# Patient Record
Sex: FEMALE | Race: WHITE | NOT HISPANIC OR LATINO | Employment: FULL TIME | URBAN - METROPOLITAN AREA
[De-identification: names, ages, dates, MRNs, and addresses within clinical notes are randomized per-mention and may not be internally consistent; named-entity substitution may affect disease eponyms.]

---

## 2018-02-13 ENCOUNTER — OFFICE VISIT CONVERTED (OUTPATIENT)
Dept: FAMILY MEDICINE CLINIC | Facility: CLINIC | Age: 23
End: 2018-02-13
Attending: NURSE PRACTITIONER

## 2018-08-23 ENCOUNTER — OFFICE VISIT CONVERTED (OUTPATIENT)
Dept: FAMILY MEDICINE CLINIC | Facility: CLINIC | Age: 23
End: 2018-08-23
Attending: NURSE PRACTITIONER

## 2018-11-12 ENCOUNTER — OFFICE VISIT CONVERTED (OUTPATIENT)
Dept: FAMILY MEDICINE CLINIC | Facility: CLINIC | Age: 23
End: 2018-11-12
Attending: FAMILY MEDICINE

## 2018-11-12 ENCOUNTER — CONVERSION ENCOUNTER (OUTPATIENT)
Dept: FAMILY MEDICINE CLINIC | Facility: CLINIC | Age: 23
End: 2018-11-12

## 2019-03-16 ENCOUNTER — OFFICE VISIT CONVERTED (OUTPATIENT)
Dept: FAMILY MEDICINE CLINIC | Facility: CLINIC | Age: 24
End: 2019-03-16
Attending: NURSE PRACTITIONER

## 2019-03-16 ENCOUNTER — CONVERSION ENCOUNTER (OUTPATIENT)
Dept: FAMILY MEDICINE CLINIC | Facility: CLINIC | Age: 24
End: 2019-03-16

## 2019-05-06 ENCOUNTER — OFFICE VISIT CONVERTED (OUTPATIENT)
Dept: FAMILY MEDICINE CLINIC | Facility: CLINIC | Age: 24
End: 2019-05-06
Attending: NURSE PRACTITIONER

## 2019-12-02 ENCOUNTER — CONVERSION ENCOUNTER (OUTPATIENT)
Dept: FAMILY MEDICINE CLINIC | Facility: CLINIC | Age: 24
End: 2019-12-02

## 2019-12-02 ENCOUNTER — OFFICE VISIT CONVERTED (OUTPATIENT)
Dept: FAMILY MEDICINE CLINIC | Facility: CLINIC | Age: 24
End: 2019-12-02
Attending: NURSE PRACTITIONER

## 2020-01-29 ENCOUNTER — CONVERSION ENCOUNTER (OUTPATIENT)
Dept: FAMILY MEDICINE CLINIC | Facility: CLINIC | Age: 25
End: 2020-01-29

## 2020-01-29 ENCOUNTER — OFFICE VISIT CONVERTED (OUTPATIENT)
Dept: FAMILY MEDICINE CLINIC | Facility: CLINIC | Age: 25
End: 2020-01-29
Attending: FAMILY MEDICINE

## 2020-06-30 ENCOUNTER — OFFICE VISIT CONVERTED (OUTPATIENT)
Dept: FAMILY MEDICINE CLINIC | Facility: CLINIC | Age: 25
End: 2020-06-30
Attending: FAMILY MEDICINE

## 2020-06-30 ENCOUNTER — HOSPITAL ENCOUNTER (OUTPATIENT)
Dept: FAMILY MEDICINE CLINIC | Facility: CLINIC | Age: 25
Discharge: HOME | End: 2020-06-30
Attending: FAMILY MEDICINE

## 2020-07-03 LAB
BACTERIA SPEC AEROBE CULT: ABNORMAL
SARS-COV-2 RNA SPEC QL NAA+PROBE: NOT DETECTED

## 2021-04-16 ENCOUNTER — OFFICE VISIT CONVERTED (OUTPATIENT)
Dept: FAMILY MEDICINE CLINIC | Facility: CLINIC | Age: 26
End: 2021-04-16
Attending: NURSE PRACTITIONER

## 2021-04-16 ENCOUNTER — HOSPITAL ENCOUNTER (OUTPATIENT)
Dept: FAMILY MEDICINE CLINIC | Facility: CLINIC | Age: 26
Discharge: HOME | End: 2021-04-16
Attending: NURSE PRACTITIONER

## 2021-04-16 LAB
ALBUMIN SERPL-MCNC: 4.6 G/DL (ref 3.5–5)
ALBUMIN/GLOB SERPL: 1.7 {RATIO} (ref 1.4–2.6)
ALP SERPL-CCNC: 52 U/L (ref 42–98)
ALT SERPL-CCNC: 21 U/L (ref 10–40)
ANION GAP SERPL CALC-SCNC: 16 MMOL/L (ref 8–19)
AST SERPL-CCNC: 25 U/L (ref 15–50)
BASOPHILS # BLD AUTO: 0.04 10*3/UL (ref 0–0.2)
BASOPHILS NFR BLD AUTO: 0.7 % (ref 0–3)
BILIRUB SERPL-MCNC: 0.39 MG/DL (ref 0.2–1.3)
BUN SERPL-MCNC: 10 MG/DL (ref 5–25)
BUN/CREAT SERPL: 11 {RATIO} (ref 6–20)
CALCIUM SERPL-MCNC: 9.3 MG/DL (ref 8.7–10.4)
CHLORIDE SERPL-SCNC: 101 MMOL/L (ref 99–111)
CK MB CFR SERPL ELPH: <1 NG/ML (ref 1–3.8)
CONV ABS IMM GRAN: 0.01 10*3/UL (ref 0–0.2)
CONV CO2: 26 MMOL/L (ref 22–32)
CONV IMMATURE GRAN: 0.2 % (ref 0–1.8)
CONV TOTAL PROTEIN: 7.3 G/DL (ref 6.3–8.2)
CREAT UR-MCNC: 0.95 MG/DL (ref 0.5–0.9)
DEPRECATED RDW RBC AUTO: 43.3 FL (ref 36.4–46.3)
EOSINOPHIL # BLD AUTO: 0.2 10*3/UL (ref 0–0.7)
EOSINOPHIL # BLD AUTO: 3.3 % (ref 0–7)
ERYTHROCYTE [DISTWIDTH] IN BLOOD BY AUTOMATED COUNT: 13.2 % (ref 11.7–14.4)
GFR SERPLBLD BASED ON 1.73 SQ M-ARVRAT: >60 ML/MIN/{1.73_M2}
GLOBULIN UR ELPH-MCNC: 2.7 G/DL (ref 2–3.5)
GLUCOSE SERPL-MCNC: 91 MG/DL (ref 65–99)
HCT VFR BLD AUTO: 40.6 % (ref 37–47)
HGB BLD-MCNC: 12.8 G/DL (ref 12–16)
LYMPHOCYTES # BLD AUTO: 1.71 10*3/UL (ref 1–5)
LYMPHOCYTES NFR BLD AUTO: 27.9 % (ref 20–45)
MAGNESIUM SERPL-MCNC: 2.03 MG/DL (ref 1.6–2.3)
MCH RBC QN AUTO: 28.1 PG (ref 27–31)
MCHC RBC AUTO-ENTMCNC: 31.5 G/DL (ref 33–37)
MCV RBC AUTO: 89 FL (ref 81–99)
MONOCYTES # BLD AUTO: 0.45 10*3/UL (ref 0.2–1.2)
MONOCYTES NFR BLD AUTO: 7.4 % (ref 3–10)
NEUTROPHILS # BLD AUTO: 3.71 10*3/UL (ref 2–8)
NEUTROPHILS NFR BLD AUTO: 60.5 % (ref 30–85)
NRBC CBCN: 0 % (ref 0–0.7)
OSMOLALITY SERPL CALC.SUM OF ELEC: 287 MOSM/KG (ref 273–304)
PLATELET # BLD AUTO: 285 10*3/UL (ref 130–400)
PMV BLD AUTO: 10.5 FL (ref 9.4–12.3)
POTASSIUM SERPL-SCNC: 3.8 MMOL/L (ref 3.5–5.3)
RBC # BLD AUTO: 4.56 10*6/UL (ref 4.2–5.4)
SODIUM SERPL-SCNC: 139 MMOL/L (ref 135–147)
T4 FREE SERPL-MCNC: 1.2 NG/DL (ref 0.9–1.8)
TROPONIN T SERPL-MCNC: <0.01 NG/ML (ref 0–0.1)
TSH SERPL-ACNC: 1.07 M[IU]/L (ref 0.27–4.2)
WBC # BLD AUTO: 6.12 10*3/UL (ref 4.8–10.8)

## 2021-04-19 LAB
CONV EBV EARLY ANTIGEN: 55.2 U/ML (ref 0–10.9)
CONV EBV NUCLEAR ANTIGEN: 173 U/ML (ref 0–21.9)
CONV EPSTEIN BARR VIRAL CAPSID IGM: <10 U/ML (ref 0–43.9)
CONV EPSTEIN BARR VIRUS ANTIBODY TO VIRAL CAPSID IGG: 307 U/ML (ref 0–21.9)

## 2021-05-09 VITALS
DIASTOLIC BLOOD PRESSURE: 90 MMHG | SYSTOLIC BLOOD PRESSURE: 142 MMHG | OXYGEN SATURATION: 99 % | HEART RATE: 145 BPM | WEIGHT: 138 LBS | TEMPERATURE: 98.6 F

## 2021-05-09 VITALS
HEART RATE: 127 BPM | HEIGHT: 65 IN | SYSTOLIC BLOOD PRESSURE: 118 MMHG | OXYGEN SATURATION: 100 % | BODY MASS INDEX: 23.01 KG/M2 | WEIGHT: 138.12 LBS | TEMPERATURE: 98.9 F | DIASTOLIC BLOOD PRESSURE: 70 MMHG

## 2021-05-09 VITALS
HEART RATE: 113 BPM | SYSTOLIC BLOOD PRESSURE: 140 MMHG | BODY MASS INDEX: 21.99 KG/M2 | WEIGHT: 132 LBS | TEMPERATURE: 97.8 F | HEIGHT: 65 IN | OXYGEN SATURATION: 98 % | DIASTOLIC BLOOD PRESSURE: 96 MMHG

## 2021-05-09 VITALS
HEIGHT: 65 IN | OXYGEN SATURATION: 100 % | TEMPERATURE: 96.6 F | HEART RATE: 75 BPM | WEIGHT: 136 LBS | BODY MASS INDEX: 22.66 KG/M2 | SYSTOLIC BLOOD PRESSURE: 120 MMHG | DIASTOLIC BLOOD PRESSURE: 80 MMHG

## 2021-05-09 VITALS
DIASTOLIC BLOOD PRESSURE: 70 MMHG | HEART RATE: 99 BPM | TEMPERATURE: 97.4 F | SYSTOLIC BLOOD PRESSURE: 110 MMHG | WEIGHT: 134 LBS | OXYGEN SATURATION: 99 %

## 2021-05-09 VITALS
BODY MASS INDEX: 21.99 KG/M2 | SYSTOLIC BLOOD PRESSURE: 110 MMHG | WEIGHT: 132 LBS | OXYGEN SATURATION: 100 % | HEIGHT: 65 IN | DIASTOLIC BLOOD PRESSURE: 76 MMHG | HEART RATE: 111 BPM | TEMPERATURE: 98.3 F

## 2021-05-09 VITALS
DIASTOLIC BLOOD PRESSURE: 80 MMHG | WEIGHT: 133.37 LBS | TEMPERATURE: 98.1 F | HEART RATE: 87 BPM | OXYGEN SATURATION: 97 % | SYSTOLIC BLOOD PRESSURE: 112 MMHG

## 2021-05-09 VITALS
OXYGEN SATURATION: 100 % | DIASTOLIC BLOOD PRESSURE: 78 MMHG | HEART RATE: 110 BPM | SYSTOLIC BLOOD PRESSURE: 148 MMHG | TEMPERATURE: 98.9 F | WEIGHT: 133 LBS

## 2021-05-09 VITALS
OXYGEN SATURATION: 100 % | TEMPERATURE: 97.4 F | DIASTOLIC BLOOD PRESSURE: 80 MMHG | SYSTOLIC BLOOD PRESSURE: 120 MMHG | HEART RATE: 78 BPM | WEIGHT: 126 LBS

## 2021-05-12 ENCOUNTER — OFFICE VISIT CONVERTED (OUTPATIENT)
Dept: FAMILY MEDICINE CLINIC | Facility: CLINIC | Age: 26
End: 2021-05-12
Attending: FAMILY MEDICINE

## 2021-05-22 ENCOUNTER — TRANSCRIBE ORDERS (OUTPATIENT)
Dept: FAMILY MEDICINE CLINIC | Facility: CLINIC | Age: 26
End: 2021-05-22

## 2021-05-22 DIAGNOSIS — R07.9 CHEST PAIN IN ADULT: Primary | ICD-10-CM

## 2021-06-01 ENCOUNTER — HOSPITAL ENCOUNTER (OUTPATIENT)
Dept: OTHER | Facility: HOSPITAL | Age: 26
Discharge: HOME | End: 2021-06-01
Attending: FAMILY MEDICINE

## 2021-06-04 ENCOUNTER — OFFICE VISIT CONVERTED (OUTPATIENT)
Dept: FAMILY MEDICINE CLINIC | Facility: CLINIC | Age: 26
End: 2021-06-04
Attending: FAMILY MEDICINE

## 2021-06-05 NOTE — PROGRESS NOTES
Progress Note      Patient Name: Nikole Kearney   Patient ID: [id number]   Sex: Female   YOB: 1995        Visit Date: June 4, 2021    Provider: Shweta Pate DO   Location: [facility]   Location Address: [address] [place] [address] [place]  [address]   Location Phone: [phone]          Chief Complaint     Follow up on issues going on       History Of Present Illness  Nikole Kearney is a 25 year old /White female who presents for evaluation and treatment of:      1.) CHEST PAIN : COVID infection during the past few months. Patient presented with c/o chest pain. Workup so far has been negative. Patient reports that she continues to experience an intermittent chest pain - sometimes associated with exertions. Per patient - 'I wonder if I have environmental induced asthmatic sxs.'       Past Medical History  Disease Name Date Onset Notes   **No Past Medical History --  --          Past Surgical History  Procedure Name Date Notes   *Denies any surgical procedures --  --          Allergy List  Allergen Name Date Reaction Notes   amoxicillin --  --  --    PENICILLINS --  --  --          Family Medical History  Disease Name Relative/Age Notes   *No Significant Family Medical History  --          Social History  Finding Status Start/Stop Quantity Notes   Alcohol Never --/-- --  never drinks alcohol   Exercises regularly --  --/-- --  3-4 times per week   Recreational Drug Use Never --/-- --  never used   Tobacco Never --/-- --  never smoker, never uses other tobacco products   Uses seatbelts --  --/-- --  yes         Immunizations  NameDate Admin Mfg Trade Name Lot Number Route Inj VIS Given VIS Publication   [id number]/15/2018 UNK Unknown TradeName [id number] NE NE 11/12/2018 08/07/2015   Comments:          Review of Systems  · Constitutional  o Denies  o : fatigue, night sweats  · Eyes  o Denies  o : double vision, blurred vision  · HENT  o Denies  o : vertigo, recent head  injury  · Cardiovascular  o Admits  o : chest pain  o Denies  o : irregular heart beats  · Respiratory  o Denies  o : shortness of breath, productive cough  · Gastrointestinal  o Denies  o : nausea, vomiting  · Genitourinary  o Denies  o : dysuria, urinary retention  · Integument  o Denies  o : hair growth change, new skin lesions  · Neurologic  o Denies  o : altered mental status, seizures  · Musculoskeletal  o Denies  o : joint swelling, limitation of motion  · Endocrine  o Denies  o : cold intolerance, heat intolerance  · Heme-Lymph  o Denies  o : petechiae, lymph node enlargement or tenderness  · Allergic-Immunologic  o Denies  o : frequent illnesses      Vitals  Date Time BP Position Site L\R Cuff Size HR RR TEMP (F) WT  HT  BMI kg/m2 BSA m2 O2 Sat FR L/min FiO2 HC       06/04/2021 08:49 /78 Sitting    79 - R  97.5 138lbs 0oz    96 %            Physical Examination  · Constitutional  o Appearance  o : alert, in no acute distress  · Head and Face  o HEENT  o : hearing is normal for conversation   · Eyes  o Conjunctivae  o : conjunctivae normal  o Pupils and Irises  o : pupils equal and round  · Neck  o Inspection/Palpation  o : supple  · Respiratory  o Respiratory Effort  o : breathing unlabored  · Cardiovascular  o Peripheral Vascular System  o :   § Extremities  § : no cyanosis  · Gastrointestinal  o Abdominal Examination  o :   § Abdomen  § : non-distended   · Lymphatic  o Neck  o : normal size  · Musculoskeletal  o General  o :   § General Musculoskeletal  § : no joint swelling appreciated   · Skin and Subcutaneous Tissue  o General Inspection  o : no rash appreciated   · Neurologic  o Gait and Station  o :   § Gait Screening  § : normal gait  · Psychiatric  o Mood and Affect  o : mood normal, affect appropriate          Assessment  · Chest pain     786.50/R07.9    A.) Unremarkable workup so far. Watchful waiting during these time. Trial of ANNABELLE as noted below. Follow up as needed.          Plan  · Orders  o ACO-39: Current medications updated and reviewed (1159F, ) - - 06/04/2021  · Medications  o Ventolin HFA 90 mcg/actuation inhalation HFA aerosol inhaler   SIG: inhale 2 puffs (180 mcg) by inhalation route every 4-6 hours as needed   DISP: (8) Gram with 0 refills  Prescribed on 06/04/2021     o Medications have been Reconciled  o Transition of Care or Provider Policy  · Instructions  o Take all medications as prescribed/directed.  o Patient was educated/instructed on their diagnosis, treatment and medications prior to discharge from the clinic today.            Electronically Signed by: Shweta Pate DO - on June 4, 2021 09:16:03 AM

## 2021-06-05 NOTE — PROGRESS NOTES
Progress Note      Patient Name: Nikole Kearney   Patient ID: [id number]   Sex: Female   YOB: 1995        Visit Date: May 12, 2021    Provider: Shweta Pate DO   Location: [facility]   Location Address: [address] [place] [address] [place]  [address]   Location Phone: [phone]          Chief Complaint     Has had CP for over 1 month       History Of Present Illness  Nikole Kearney is a 25 year old /White female who presents for evaluation and treatment of:      1.) CHEST PAIN : Onset - 1 month ago. No recent injury. Patient reports chest pain that appears to be located posterior to her left breast tissue. She reports an intermittent, sharp, stabbing pain. She reports worsening of her sxs when supine. Pain is not reproducible. She was recently evaluated here in our office - testing included CXR, EKG and labs (including troponins) - negative. Patient denies any remitting factors at home. She denies a history of GERD. Breast exam during her most recent visit was unremarkable.       Past Medical History  Disease Name Date Onset Notes   **No Past Medical History --  --          Past Surgical History  Procedure Name Date Notes   *Denies any surgical procedures --  --          Allergy List  Allergen Name Date Reaction Notes   amoxicillin --  --  --    PENICILLINS --  --  --          Family Medical History  Disease Name Relative/Age Notes   *No Significant Family Medical History  --          Social History  Finding Status Start/Stop Quantity Notes   Alcohol Never --/-- --  never drinks alcohol   Exercises regularly --  --/-- --  3-4 times per week   Recreational Drug Use Never --/-- --  never used   Tobacco Never --/-- --  never smoker, never uses other tobacco products   Uses seatbelts --  --/-- --  yes         Immunizations  NameDate Admin Mfg Trade Name Lot Number Route Inj VIS Given VIS Publication   [id number]/15/2018 UNK Unknown TradeName [id number] NE NE 11/12/2018  08/07/2015   Comments:          Review of Systems  · Constitutional  o Denies  o : fatigue, night sweats  · Eyes  o Denies  o : double vision, blurred vision  · HENT  o Denies  o : vertigo, recent head injury  · Cardiovascular  o Admits  o : chest pain  · Respiratory  o Denies  o : wheezing, cough  · Gastrointestinal  o Denies  o : nausea, vomiting  · Genitourinary  o Denies  o : dysuria, urinary retention  · Integument  o Denies  o : hair growth change, new skin lesions  · Neurologic  o Denies  o : altered mental status, seizures  · Musculoskeletal  o Denies  o : joint swelling, limitation of motion  · Endocrine  o Denies  o : cold intolerance, heat intolerance  · Psychiatric  o Denies  o : hallucinations, delusions  · Heme-Lymph  o Denies  o : petechiae, lymph node enlargement or tenderness  · Allergic-Immunologic  o Denies  o : frequent illnesses      Vitals  Date Time BP Position Site L\R Cuff Size HR RR TEMP (F) WT  HT  BMI kg/m2 BSA m2 O2 Sat FR L/min FiO2        05/12/2021 02:46 /72 Sitting    119 - R  97.8 139lbs 0oz    98 %  21%          Physical Examination  · Constitutional  o Appearance  o : alert, in no acute distress  · Head and Face  o HEENT  o : hearing is normal for conversation   · Eyes  o Conjunctivae  o : conjunctivae normal  o Pupils and Irises  o : pupils equal and round  · Neck  o Inspection/Palpation  o : supple  o Thyroid  o : no thyromegaly  · Respiratory  o Respiratory Effort  o : breathing unlabored  o Auscultation of Lungs  o : clear to ascultation  · Cardiovascular  o Heart  o :   § Auscultation of Heart  § : regular rate and rhythm  · Lymphatic  o Neck  o : supple  · Musculoskeletal  o General  o :   § General Musculoskeletal  § : no joint swelling appreciated   · Skin and Subcutaneous Tissue  o General Inspection  o : no lesions present, no areas of discoloration, skin turgor normal, texture normal  · Neurologic  o Gait and Station  o :   § Gait Screening  § : normal  gait  · Psychiatric  o Mood and Affect  o : mood normal, affect appropriate          Assessment  · Chest wall pain     786.52/R07.89    A.) ? Etiology. Will start PPI to rule out GERD. Will order diagnostic mammogram to rule out breast etiology. If indicated, will consider chest CT.          *FOLLOW UP WILL BE DETERMINED PER REVIEW OF MAMMOGRAM .*       Plan  · Orders  o ACO-39: Current medications updated and reviewed (1159F, ) - - 05/12/2021  o Mammogram diagnostic 2D breast unilateral LEFT (w/US if needed) [facility]. (22355, -ML) - 786.52/R07.89 - 05/12/2021  · Medications  o omeprazole 20 mg oral capsule,delayed release(DR/EC)   SIG: take 1 capsule by oral route once a day (in the morning) for 30 days take 30 mins prior to breakfast   DISP: (30) Capsule with 0 refills  Prescribed on 05/12/2021     o Medications have been Reconciled  o Transition of Care or Provider Policy  · Instructions  o Take all medications as prescribed/directed.  o Patient was educated/instructed on their diagnosis, treatment and medications prior to discharge from the clinic today.            Electronically Signed by: Shweta Pate DO - on May 12, 2021 03:21:47 PM

## 2021-06-10 ENCOUNTER — APPOINTMENT (OUTPATIENT)
Dept: ULTRASOUND IMAGING | Facility: HOSPITAL | Age: 26
End: 2021-06-10

## 2021-06-10 ENCOUNTER — APPOINTMENT (OUTPATIENT)
Dept: MAMMOGRAPHY | Facility: HOSPITAL | Age: 26
End: 2021-06-10

## 2021-07-15 VITALS
WEIGHT: 139 LBS | TEMPERATURE: 97.8 F | DIASTOLIC BLOOD PRESSURE: 72 MMHG | BODY MASS INDEX: 23.13 KG/M2 | OXYGEN SATURATION: 98 % | SYSTOLIC BLOOD PRESSURE: 138 MMHG | HEART RATE: 119 BPM

## 2021-07-15 VITALS
SYSTOLIC BLOOD PRESSURE: 122 MMHG | DIASTOLIC BLOOD PRESSURE: 78 MMHG | BODY MASS INDEX: 22.96 KG/M2 | OXYGEN SATURATION: 96 % | WEIGHT: 138 LBS | HEART RATE: 79 BPM | TEMPERATURE: 97.5 F

## 2021-08-10 ENCOUNTER — LAB (OUTPATIENT)
Dept: FAMILY MEDICINE CLINIC | Facility: CLINIC | Age: 26
End: 2021-08-10

## 2021-08-10 DIAGNOSIS — R19.7 DIARRHEA, UNSPECIFIED TYPE: ICD-10-CM

## 2021-08-10 DIAGNOSIS — R19.7 DIARRHEA, UNSPECIFIED TYPE: Primary | ICD-10-CM

## 2021-08-10 LAB
027 TOXIN: ABNORMAL
C DIFF TOX GENS STL QL NAA+PROBE: POSITIVE

## 2021-08-10 PROCEDURE — 87493 C DIFF AMPLIFIED PROBE: CPT | Performed by: FAMILY MEDICINE

## 2021-08-11 DIAGNOSIS — A04.72 C. DIFFICILE DIARRHEA: Primary | ICD-10-CM

## 2021-08-11 RX ORDER — VANCOMYCIN HYDROCHLORIDE 125 MG/1
125 CAPSULE ORAL 4 TIMES DAILY
Qty: 40 CAPSULE | Refills: 0 | Status: SHIPPED | OUTPATIENT
Start: 2021-08-11 | End: 2021-08-16

## 2021-08-16 ENCOUNTER — OFFICE VISIT (OUTPATIENT)
Dept: FAMILY MEDICINE CLINIC | Facility: CLINIC | Age: 26
End: 2021-08-16

## 2021-08-16 ENCOUNTER — TELEPHONE (OUTPATIENT)
Dept: FAMILY MEDICINE CLINIC | Facility: CLINIC | Age: 26
End: 2021-08-16

## 2021-08-16 VITALS
OXYGEN SATURATION: 99 % | SYSTOLIC BLOOD PRESSURE: 100 MMHG | TEMPERATURE: 96.5 F | HEART RATE: 75 BPM | WEIGHT: 137.8 LBS | DIASTOLIC BLOOD PRESSURE: 75 MMHG | BODY MASS INDEX: 22.93 KG/M2

## 2021-08-16 DIAGNOSIS — K29.01 ACUTE GASTRITIS WITH HEMORRHAGE, UNSPECIFIED GASTRITIS TYPE: ICD-10-CM

## 2021-08-16 DIAGNOSIS — T78.40XA ALLERGIC REACTION, INITIAL ENCOUNTER: ICD-10-CM

## 2021-08-16 DIAGNOSIS — A04.72 C. DIFFICILE DIARRHEA: Primary | ICD-10-CM

## 2021-08-16 LAB
CHROMATIN AB SERPL-ACNC: <10 IU/ML (ref 0–14)
CK SERPL-CCNC: 62 U/L (ref 20–180)
CRP SERPL-MCNC: <0.3 MG/DL (ref 0–0.5)
ERYTHROCYTE [SEDIMENTATION RATE] IN BLOOD: 5 MM/HR (ref 0–20)

## 2021-08-16 PROCEDURE — 83516 IMMUNOASSAY NONANTIBODY: CPT | Performed by: FAMILY MEDICINE

## 2021-08-16 PROCEDURE — 86140 C-REACTIVE PROTEIN: CPT | Performed by: FAMILY MEDICINE

## 2021-08-16 PROCEDURE — 82550 ASSAY OF CK (CPK): CPT | Performed by: FAMILY MEDICINE

## 2021-08-16 PROCEDURE — 86038 ANTINUCLEAR ANTIBODIES: CPT | Performed by: FAMILY MEDICINE

## 2021-08-16 PROCEDURE — 86200 CCP ANTIBODY: CPT | Performed by: FAMILY MEDICINE

## 2021-08-16 PROCEDURE — 82784 ASSAY IGA/IGD/IGG/IGM EACH: CPT | Performed by: FAMILY MEDICINE

## 2021-08-16 PROCEDURE — 99213 OFFICE O/P EST LOW 20 MIN: CPT | Performed by: FAMILY MEDICINE

## 2021-08-16 PROCEDURE — 85652 RBC SED RATE AUTOMATED: CPT | Performed by: FAMILY MEDICINE

## 2021-08-16 PROCEDURE — 86431 RHEUMATOID FACTOR QUANT: CPT | Performed by: FAMILY MEDICINE

## 2021-08-16 NOTE — PROGRESS NOTES
Chief Complaint    Rash (Pt pos for C-diff and having reaction to meds.)    Subjective      Nikole Kearney presents to [facility]     History of Present Illness    1.) RASH/C. DIFFICILE DIARRHEA : Recently placed on vancomycin secondary to C. difficile diagnosis. The patient reports onset of a diffuse rash after completing 4 days of the medication. She reports a pruritic erythematous rash.  She continues to experience watery, sometimes bloody diarrhea.    2.) CONCERN FOR ALLERGENS : Patient reports that she would like testing for allergens secondary to recent illnesses.    3.) CONCERN FOR AUTOIMMUNE CONDITION : Patient reports that she would like testing for autoimmune conditions secondary to recent illnesses.    Objective      Vital Signs:     /75   Pulse 75   Temp 96.5 °F (35.8 °C)   Wt 62.5 kg (137 lb 12.8 oz)   SpO2 99%   BMI 22.93 kg/m²       Physical Exam  Vitals reviewed.   Constitutional:       General: She is not in acute distress.     Appearance: Normal appearance. She is well-developed.   HENT:      Head: Normocephalic and atraumatic.      Right Ear: Hearing and external ear normal.      Left Ear: Hearing and external ear normal.      Nose: Nose normal.   Eyes:      General: Lids are normal.         Right eye: No discharge.         Left eye: No discharge.      Conjunctiva/sclera: Conjunctivae normal.   Pulmonary:      Effort: Pulmonary effort is normal.   Abdominal:      General: There is no distension.   Musculoskeletal:         General: No swelling.      Cervical back: Neck supple.   Skin:     Coloration: Skin is not jaundiced.      Findings: No erythema.   Neurological:      Mental Status: She is alert. Mental status is at baseline.   Psychiatric:         Mood and Affect: Mood and affect normal.         Thought Content: Thought content normal.        Assessment and Plan    Diagnoses and all orders for this visit:    1. C. difficile diarrhea  (Primary)  Comments:  1.) Switched to Fidaxomicin. If not covered or if not available, will switch to Metronidazole.   Orders:  -     Celiac Disease Antibody Screen  -     PAULINA Direct Reflex to [facility]  -     Cyclic citrul peptide antibody, IgG/IgA  -     CK  -     C-reactive protein  -     Rheumatoid factor  -     Sedimentation rate    2. Acute gastritis with hemorrhage, unspecified gastritis type  Comments:  1.) Labs as noted.   Orders:  -     Celiac Disease Antibody Screen  -     PAULINA Direct Reflex to [facility]  -     Cyclic citrul peptide antibody, IgG/IgA  -     CK  -     C-reactive protein  -     Rheumatoid factor  -     Sedimentation rate    3. Allergic reaction, initial encounter  -     Ambulatory Referral to Allergy    Other orders  -     fidaxomicin (DIFICID) 200 MG tablet; Take 1 tablet by mouth 2 (Two) Times a Day for 10 days.  Dispense: 20 tablet; Refill: 0    Follow Up     Return for follow up regarding diarrhea at completion of antibiotic course.     Patient was given instructions and counseling regarding her condition or for health maintenance advice. Please see specific information pulled into the AVS if appropriate.

## 2021-08-16 NOTE — PROGRESS NOTES
Venipuncture Blood Specimen Collection  Venipuncture performed in left arm by Hyacinth Aly with good hemostasis. Patient tolerated the procedure well without complications.   08/16/21   Hyacinth Aly

## 2021-08-16 NOTE — TELEPHONE ENCOUNTER
Caller: Nikole Kearney    Relationship: Self    Best call back number: [phone]    Medication needed:   fidaxomicin (DIFICID) 200 MG tablet    PATIENT WAS SEEN IN OFFICE TODAY AND WAS PRESCRIBED A NEW MEDICATION DUE TO THE PREVIOUS ONE GIVING HER AN ALLERGIC REACTION. SHE STATED THAT WHEN SHE WENT TO THE PHARMACY THEY STATED THAT A PA WAS NEEDED. PLEASE ADVISE     What is the patient's [facility] [address] [place] [address] - [address] [place] [address] [phone] [facility] [phone]

## 2021-08-17 LAB
ANA SER QL: NEGATIVE
GLIADIN PEPTIDE IGA SER-ACNC: 5 UNITS (ref 0–19)
IGA SERPL-MCNC: 116 MG/DL (ref 87–352)
TTG IGA SER-ACNC: <2 U/ML (ref 0–3)

## 2021-08-18 LAB — CCP IGA+IGG SERPL IA-ACNC: 4 UNITS (ref 0–19)

## 2021-08-19 ENCOUNTER — TELEPHONE (OUTPATIENT)
Dept: FAMILY MEDICINE CLINIC | Facility: CLINIC | Age: 26
End: 2021-08-19

## 2021-08-19 NOTE — TELEPHONE ENCOUNTER
Caller: Nikole Kearney    Relationship: Self    Best call back number: [phone] PATIENT ALSO WANTS TO  FORM WHEN PREPARED     What form or medical record are you requesting: SHORT TERM DISABILITY FORM FROM [employer]    Who is requesting this form or medical record from you: EMPLOYER    How would you like to receive the form or medical records (pick-up, mail, fax):   If fax, what is the fax number: [phone]  If mail, what is the address:   If pick-up, provide patient with address and location details    Timeframe paperwork needed: 8/23/2021    Additional notes: PATIENT STATED THAT SHE WANTS TO BE CALLED IF THIS HAS NOT BEEN RECEIVED

## 2021-08-30 ENCOUNTER — OFFICE VISIT (OUTPATIENT)
Dept: FAMILY MEDICINE CLINIC | Facility: CLINIC | Age: 26
End: 2021-08-30

## 2021-08-30 VITALS
TEMPERATURE: 97.1 F | DIASTOLIC BLOOD PRESSURE: 70 MMHG | HEIGHT: 66 IN | BODY MASS INDEX: 22.18 KG/M2 | WEIGHT: 138 LBS | HEART RATE: 75 BPM | SYSTOLIC BLOOD PRESSURE: 122 MMHG | OXYGEN SATURATION: 100 %

## 2021-08-30 DIAGNOSIS — A04.72 C. DIFFICILE DIARRHEA: Primary | ICD-10-CM

## 2021-08-30 PROCEDURE — 87324 CLOSTRIDIUM AG IA: CPT | Performed by: FAMILY MEDICINE

## 2021-08-30 PROCEDURE — 99213 OFFICE O/P EST LOW 20 MIN: CPT | Performed by: FAMILY MEDICINE

## 2021-08-30 NOTE — PROGRESS NOTES
"Chief Complaint    Diarrhea (cdiff,still having cramping and loose stool)    Subjective      Nikole Kearney presents to [facility]     History of Present Illness    1.) C. DIFFICILE DIARRHEA : Patient presents after completion of course of Fidaxomicin. She reports that she is no longer experiencing watery stools, bit admits to 'mushy' bowel movements. She also reports that she has been experiencing intermittent abdominal cramping of her lower abdominal wall.     Objective      Vital Signs:     /70 (BP Location: Left arm, Patient Position: Sitting, Cuff Size: Adult)   Pulse 75   Temp 97.1 °F (36.2 °C) (Temporal)   Ht 167.6 cm (66\")   Wt 62.6 kg (138 lb)   SpO2 100%   BMI 22.27 kg/m²       Physical Exam  Vitals reviewed.   Constitutional:       General: She is not in acute distress.     Appearance: Normal appearance. She is well-developed.   HENT:      Head: Normocephalic and atraumatic.      Right Ear: Hearing and external ear normal.      Left Ear: Hearing and external ear normal.      Nose: Nose normal.   Eyes:      General: Lids are normal.         Right eye: No discharge.         Left eye: No discharge.      Conjunctiva/sclera: Conjunctivae normal.   Pulmonary:      Effort: Pulmonary effort is normal.   Abdominal:      General: Bowel sounds are normal. There is no distension.      Comments: Mild tenderness of mid-lower abdominal region    Musculoskeletal:         General: No swelling.      Cervical back: Neck supple.   Skin:     Coloration: Skin is not jaundiced.      Findings: No erythema.   Neurological:      Mental Status: She is alert. Mental status is at baseline.   Psychiatric:         Mood and Affect: Mood and affect normal.         Thought Content: Thought content normal.     Assessment and Plan    Diagnoses and all orders for this visit:    1. C. difficile diarrhea (Primary)  Comments:  1.) Repeat testing as noted. Advised adequate fiber and water intake. " Additional recommendations per review of result.  Orders:  -     Clostridium Difficile EIA - Stool, Per Rectum      Patient was given instructions and counseling regarding her condition or for health maintenance advice. Please see specific information pulled into the AVS if appropriate.

## 2021-09-01 LAB — C DIFF TOX A+B STL QL IA: NEGATIVE

## 2021-10-13 ENCOUNTER — OFFICE VISIT (OUTPATIENT)
Dept: FAMILY MEDICINE CLINIC | Facility: CLINIC | Age: 26
End: 2021-10-13

## 2021-10-13 VITALS
BODY MASS INDEX: 23.24 KG/M2 | WEIGHT: 144 LBS | OXYGEN SATURATION: 98 % | TEMPERATURE: 98 F | HEART RATE: 102 BPM | DIASTOLIC BLOOD PRESSURE: 82 MMHG | SYSTOLIC BLOOD PRESSURE: 134 MMHG

## 2021-10-13 DIAGNOSIS — R30.0 DYSURIA: Primary | ICD-10-CM

## 2021-10-13 LAB
BILIRUB BLD-MCNC: NEGATIVE MG/DL
CLARITY, POC: CLEAR
COLOR UR: YELLOW
EXPIRATION DATE: ABNORMAL
GLUCOSE UR STRIP-MCNC: NEGATIVE MG/DL
KETONES UR QL: NEGATIVE
LEUKOCYTE EST, POC: ABNORMAL
Lab: ABNORMAL
NITRITE UR-MCNC: POSITIVE MG/ML
PH UR: 6 [PH] (ref 5–8)
PROT UR STRIP-MCNC: ABNORMAL MG/DL
RBC # UR STRIP: NEGATIVE /UL
SP GR UR: 1.03 (ref 1–1.03)
UROBILINOGEN UR QL: NORMAL

## 2021-10-13 PROCEDURE — 99213 OFFICE O/P EST LOW 20 MIN: CPT | Performed by: FAMILY MEDICINE

## 2021-10-13 PROCEDURE — 81003 URINALYSIS AUTO W/O SCOPE: CPT | Performed by: FAMILY MEDICINE

## 2021-10-13 RX ORDER — PHENAZOPYRIDINE HYDROCHLORIDE 200 MG/1
200 TABLET, FILM COATED ORAL 3 TIMES DAILY PRN
Qty: 6 TABLET | Refills: 0 | Status: SHIPPED | OUTPATIENT
Start: 2021-10-13 | End: 2022-03-19

## 2021-10-13 RX ORDER — CIPROFLOXACIN 250 MG/1
250 TABLET, FILM COATED ORAL 2 TIMES DAILY
Qty: 6 TABLET | Refills: 0 | Status: SHIPPED | OUTPATIENT
Start: 2021-10-13 | End: 2021-10-16

## 2021-10-13 NOTE — PROGRESS NOTES
Chief Complaint    Urinary Tract Infection    Subjective      Nikole Kearney presents to [facility]     History of Present Illness    1.) DYSURIA : Onset - 5 days ago. Per patient - now experiencing discomfort wit urination. No fevers or chills. No flank pain.     Objective      Vital Signs:     /82   Pulse 102   Temp 98 °F (36.7 °C)   Wt 65.3 kg (144 lb)   SpO2 98%   BMI 23.24 kg/m²       Physical Exam  Vitals reviewed.   Constitutional:       General: She is not in acute distress.     Appearance: Normal appearance. She is well-developed.   HENT:      Head: Normocephalic and atraumatic.      Right Ear: Hearing and external ear normal.      Left Ear: Hearing and external ear normal.      Nose: Nose normal.   Eyes:      General: Lids are normal.         Right eye: No discharge.         Left eye: No discharge.      Conjunctiva/sclera: Conjunctivae normal.   Pulmonary:      Effort: Pulmonary effort is normal.   Abdominal:      General: There is no distension.   Musculoskeletal:         General: No swelling.      Cervical back: Neck supple.   Skin:     Coloration: Skin is not jaundiced.      Findings: No erythema.   Neurological:      Mental Status: She is alert. Mental status is at baseline.   Psychiatric:         Mood and Affect: Mood and affect normal.         Thought Content: Thought content normal.     Assessment and Plan    Diagnoses and all orders for this visit:    1. Dysuria (Primary)  Comments:  1.) UA as noted. Start abx and Pyridium as noted. If no improvement, will request urine for culture.  Orders:  -     POCT urinalysis dipstick, automated    Other orders  -     ciprofloxacin (Cipro) 250 MG tablet; Take 1 tablet by mouth 2 (Two) Times a Day for 3 days.  Dispense: 6 tablet; Refill: 0  -     phenazopyridine (Pyridium) 200 MG tablet; Take 1 tablet by mouth 3 (Three) Times a Day As Needed for Bladder Spasms.  Dispense: 6 tablet; Refill: 0    Follow Up     Return as  needed.     Patient was given instructions and counseling regarding her condition or for health maintenance advice. Please see specific information pulled into the AVS if appropriate.

## 2021-12-01 RX ORDER — SULFAMETHOXAZOLE AND TRIMETHOPRIM 800; 160 MG/1; MG/1
1 TABLET ORAL 2 TIMES DAILY
Qty: 10 TABLET | Refills: 0 | Status: SHIPPED | OUTPATIENT
Start: 2021-12-01 | End: 2021-12-06

## 2021-12-06 ENCOUNTER — OFFICE VISIT (OUTPATIENT)
Dept: FAMILY MEDICINE CLINIC | Facility: CLINIC | Age: 26
End: 2021-12-06

## 2021-12-06 VITALS
OXYGEN SATURATION: 99 % | SYSTOLIC BLOOD PRESSURE: 112 MMHG | HEIGHT: 66 IN | WEIGHT: 146 LBS | BODY MASS INDEX: 23.46 KG/M2 | TEMPERATURE: 98.2 F | DIASTOLIC BLOOD PRESSURE: 70 MMHG | HEART RATE: 113 BPM

## 2021-12-06 DIAGNOSIS — Z34.90 PREGNANCY, UNSPECIFIED GESTATIONAL AGE: Primary | ICD-10-CM

## 2021-12-06 LAB — HCG INTACT+B SERPL-ACNC: 385.1 MIU/ML

## 2021-12-06 PROCEDURE — 99213 OFFICE O/P EST LOW 20 MIN: CPT | Performed by: FAMILY MEDICINE

## 2021-12-06 PROCEDURE — 84702 CHORIONIC GONADOTROPIN TEST: CPT | Performed by: FAMILY MEDICINE

## 2021-12-06 NOTE — PROGRESS NOTES
Venipuncture Blood Specimen Collection  Venipuncture performed in left arm  by Marilu Villeda with good hemostasis. Patient tolerated the procedure well without complications.   12/06/21   Marilu Villeda

## 2021-12-06 NOTE — PROGRESS NOTES
"Chief Complaint    Labs Only (patient needs labs )    Subjective      Nikole Kearney presents to [facility]     History of Present Illness    1.) PREGNANCY : Patient presents after most recent pregnancy test.  Reports that she has been experiencing intermittent vaginal bleeding. She has already established care with [facility] (Dr. Murillo). She has been advised a serum HCG.    Objective      Vital Signs:     /70 (BP Location: Left arm)   Pulse 113   Temp 98.2 °F (36.8 °C)   Ht 167.6 cm (66\")   Wt 66.2 kg (146 lb)   SpO2 99%   BMI 23.57 kg/m²       Physical Exam  Vitals reviewed.   Constitutional:       General: She is not in acute distress.     Appearance: Normal appearance. She is well-developed.   HENT:      Head: Normocephalic and atraumatic.      Right Ear: Hearing and external ear normal.      Left Ear: Hearing and external ear normal.      Nose: Nose normal.   Eyes:      General: Lids are normal.         Right eye: No discharge.         Left eye: No discharge.      Conjunctiva/sclera: Conjunctivae normal.   Pulmonary:      Effort: Pulmonary effort is normal.   Abdominal:      General: There is no distension.   Musculoskeletal:         General: No swelling.      Cervical back: Neck supple.   Skin:     Coloration: Skin is not jaundiced.      Findings: No erythema.   Neurological:      Mental Status: She is alert. Mental status is at baseline.   Psychiatric:         Mood and Affect: Mood and affect normal.         Thought Content: Thought content normal.     Assessment and Plan    Diagnoses and all orders for this visit:    1. Pregnancy, unspecified gestational age (Primary)  -     hCG, Quantitative, Pregnancy    Patient was given instructions and counseling regarding her condition or for health maintenance advice. Please see specific information pulled into the AVS if appropriate.     Answers for HPI/ROS submitted by the patient on 12/4/2021  Please describe your " symptoms.: Needing HCG levels checked. Have had 5 positive pregnancy test and my OBGYN wants me getting this done.  Have you had these symptoms before?: No  How long have you been having these symptoms?: 1-2 weeks  Please list any medications you are currently taking for this condition.: None  Please describe any probable cause for these symptoms. : Abdominal pain and mild bleeding.  What is the primary reason for your visit?: Other

## 2021-12-07 DIAGNOSIS — N92.6 MISSED MENSES: Primary | ICD-10-CM

## 2021-12-10 ENCOUNTER — CLINICAL SUPPORT (OUTPATIENT)
Dept: FAMILY MEDICINE CLINIC | Facility: CLINIC | Age: 26
End: 2021-12-10

## 2021-12-10 DIAGNOSIS — Z34.90 PREGNANCY, UNSPECIFIED GESTATIONAL AGE: ICD-10-CM

## 2021-12-10 DIAGNOSIS — N92.6 MISSED MENSES: ICD-10-CM

## 2021-12-10 LAB
ABO GROUP BLD: NORMAL
HCG INTACT+B SERPL-ACNC: 2932 MIU/ML
RH BLD: POSITIVE

## 2021-12-10 PROCEDURE — 86901 BLOOD TYPING SEROLOGIC RH(D): CPT | Performed by: FAMILY MEDICINE

## 2021-12-10 PROCEDURE — 84702 CHORIONIC GONADOTROPIN TEST: CPT | Performed by: FAMILY MEDICINE

## 2021-12-10 PROCEDURE — 86900 BLOOD TYPING SEROLOGIC ABO: CPT | Performed by: FAMILY MEDICINE

## 2021-12-10 PROCEDURE — 36415 COLL VENOUS BLD VENIPUNCTURE: CPT | Performed by: FAMILY MEDICINE

## 2021-12-10 NOTE — PROGRESS NOTES
Venipuncture Blood Specimen Collection  Venipuncture performed in left arm by Hyacinth Aly with good hemostasis. Patient tolerated the procedure well without complications.   12/10/21   Hyacinth Aly

## 2022-03-19 ENCOUNTER — OFFICE VISIT (OUTPATIENT)
Dept: FAMILY MEDICINE CLINIC | Facility: CLINIC | Age: 27
End: 2022-03-19

## 2022-03-19 VITALS
WEIGHT: 142 LBS | DIASTOLIC BLOOD PRESSURE: 70 MMHG | OXYGEN SATURATION: 100 % | BODY MASS INDEX: 22.92 KG/M2 | SYSTOLIC BLOOD PRESSURE: 108 MMHG | TEMPERATURE: 98.2 F | HEART RATE: 95 BPM

## 2022-03-19 DIAGNOSIS — Z3A.19 19 WEEKS GESTATION OF PREGNANCY: ICD-10-CM

## 2022-03-19 DIAGNOSIS — R11.0 NAUSEA: ICD-10-CM

## 2022-03-19 DIAGNOSIS — J10.1 INFLUENZA A: Primary | ICD-10-CM

## 2022-03-19 DIAGNOSIS — J06.9 UPPER RESPIRATORY TRACT INFECTION, UNSPECIFIED TYPE: ICD-10-CM

## 2022-03-19 DIAGNOSIS — R01.1 CARDIAC MURMUR: ICD-10-CM

## 2022-03-19 LAB
EXPIRATION DATE: NORMAL
EXPIRATION DATE: NORMAL
FLUAV AG UPPER RESP QL IA.RAPID: DETECTED
FLUBV AG UPPER RESP QL IA.RAPID: NOT DETECTED
INTERNAL CONTROL: NORMAL
INTERNAL CONTROL: NORMAL
Lab: NORMAL
Lab: NORMAL
S PYO AG THROAT QL: NEGATIVE
SARS-COV-2 AG UPPER RESP QL IA.RAPID: NOT DETECTED

## 2022-03-19 PROCEDURE — 87880 STREP A ASSAY W/OPTIC: CPT | Performed by: NURSE PRACTITIONER

## 2022-03-19 PROCEDURE — 99213 OFFICE O/P EST LOW 20 MIN: CPT | Performed by: NURSE PRACTITIONER

## 2022-03-19 PROCEDURE — 87428 SARSCOV & INF VIR A&B AG IA: CPT | Performed by: NURSE PRACTITIONER

## 2022-03-19 RX ORDER — ASPIRIN 81 MG/1
81 TABLET ORAL DAILY
COMMUNITY
End: 2022-09-07

## 2022-03-19 RX ORDER — OSELTAMIVIR PHOSPHATE 75 MG/1
75 CAPSULE ORAL 2 TIMES DAILY
Qty: 10 CAPSULE | Refills: 0 | Status: SHIPPED | OUTPATIENT
Start: 2022-03-19 | End: 2022-09-07

## 2022-03-19 RX ORDER — ACETAMINOPHEN 500 MG
TABLET ORAL
COMMUNITY
End: 2022-09-07

## 2022-03-19 NOTE — PROGRESS NOTES
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 No pertinent past medical history.    Allergies   Allergen Reactions   • Bee Pollen Hives   • Penicillins Hives   • Vancomycin Hives   • Clindamycin Rash        History reviewed. No pertinent surgical history.     Social History     Tobacco Use   • Smoking status: Never Smoker   • Smokeless tobacco: Never Used   Vaping Use   • Vaping Use: Never used   Substance Use Topics   • Alcohol use: Never   • Drug use: Never       History reviewed. No pertinent family history.     Health Maintenance Due   Topic Date Due   • ANNUAL PHYSICAL  Never done   • HPV VACCINES (1 - 2-dose series) Never done   • TDAP/TD VACCINES (2 - Td or Tdap) 04/25/2017   • COVID-19 Vaccine (3 - Booster for Pfizer series) 06/20/2021   • PAP SMEAR  Never done        Current Outpatient Medications on File Prior to Visit   Medication Sig   • acetaminophen (TYLENOL) 500 MG tablet Take  by mouth.   • aspirin (aspirin) 81 MG EC tablet Take 81 mg by mouth Daily.   • Prenatal MV-Min-Fe Fum-FA-DHA (PRENATAL 1 PO) Take  by mouth.   • [DISCONTINUED] phenazopyridine (Pyridium) 200 MG tablet Take 1 tablet by mouth 3 (Three) Times a Day As Needed for Bladder Spasms.     No current facility-administered medications on file prior to visit.       Immunization History   Administered Date(s) Administered   • DTP / HiB 05/28/1997   • DTaP, Unspecified 02/24/1996, 05/02/1996, 08/21/1996, 12/13/1999   • Flu Vaccine Split Quad 09/29/2020   •  Hep B, Adolescent or Pediatric 1995, 08/21/1996, 05/28/1997   • HiB 02/24/1996, 05/02/1996, 08/21/1996   • IPV 02/24/1996, 05/02/1996, 08/21/1996, 12/13/1999   • MMR 05/28/1997, 12/13/1999   • Meningococcal Polysaccharide 07/06/2009   • Tdap 04/25/2007       Review of Systems   Constitutional: Positive for chills, fatigue and fever.   HENT: Positive for postnasal drip and rhinorrhea.    Respiratory: Positive for cough. Negative for shortness of breath.    Cardiovascular: Positive for chest pain.        Not new and actually started approx 2 yrs ago with the first Dx of COVID and they heard a murmur in January and then F/U with OBGYN and they would like her referred to cardiologist    Gastrointestinal: Positive for nausea. Negative for diarrhea and vomiting.   Genitourinary: Negative for dysuria.   Musculoskeletal: Positive for arthralgias.        Body aches    Neurological: Positive for headache.        Objective     /70   Pulse 95   Temp 98.2 °F (36.8 °C)   Wt 64.4 kg (142 lb)   SpO2 100%   BMI 22.92 kg/m²       Physical Exam  Vitals and nursing note reviewed.   Constitutional:       Appearance: Normal appearance.   HENT:      Head: Normocephalic.      Right Ear: Ear canal and external ear normal.      Left Ear: Ear canal and external ear normal.      Nose: Nose normal.      Mouth/Throat:      Mouth: Mucous membranes are moist.   Eyes:      Pupils: Pupils are equal, round, and reactive to light.   Cardiovascular:      Rate and Rhythm: Normal rate and regular rhythm.      Heart sounds: Murmur heard.    Systolic murmur is present with a grade of 2/6.  Pulmonary:      Effort: Pulmonary effort is normal.      Breath sounds: Normal breath sounds.   Abdominal:      General: Bowel sounds are normal.      Palpations: Abdomen is soft.   Musculoskeletal:         General: Normal range of motion.      Cervical back: Normal range of motion and neck supple.   Skin:     General: Skin is warm and dry.    Neurological:      Mental Status: She is alert and oriented to person, place, and time.   Psychiatric:         Mood and Affect: Mood normal.         Behavior: Behavior normal.         Thought Content: Thought content normal.         Judgment: Judgment normal.         Result Review :           POCT rapid strep A (03/19/2022 10:52)  POCT SARS-CoV-2 Antigen SHELDON + Flu (03/19/2022 11:01)           Assessment and Plan      Diagnoses and all orders for this visit:    1. Influenza A (Primary)  -     oseltamivir (Tamiflu) 75 MG capsule; Take 1 capsule by mouth 2 (Two) Times a Day.  Dispense: 10 capsule; Refill: 0    2. 19 weeks gestation of pregnancy  -     oseltamivir (Tamiflu) 75 MG capsule; Take 1 capsule by mouth 2 (Two) Times a Day.  Dispense: 10 capsule; Refill: 0  -     Ambulatory Referral to Cardiology    3. Cardiac murmur  -     Ambulatory Referral to Cardiology    4. Nausea  -     POCT rapid strep A  -     POCT SARS-CoV-2 Antigen SHELDON + Flu  -     oseltamivir (Tamiflu) 75 MG capsule; Take 1 capsule by mouth 2 (Two) Times a Day.  Dispense: 10 capsule; Refill: 0    5. Upper respiratory tract infection, unspecified type  -     oseltamivir (Tamiflu) 75 MG capsule; Take 1 capsule by mouth 2 (Two) Times a Day.  Dispense: 10 capsule; Refill: 0    Patient was negative for strep throat-and negative for Covid and negative for flu B-and was positive for flu A--and I did discuss with the patient that Tamiflu is approved during pregnancy and that we will just do the typical 75 mg twice a day for 5 days but that I would like for her to reach out to her OB/GYN on Monday to make sure that they are aware of the flu A diagnoses as well as her being the 19 weeks gestation and being on this dosing regimen--and I did show the patient and [facility]. where it is safe to use during pregnancy    Advised the patient to rest stay well-hydrated and only use symptomatic treatment when necessary such as Tylenol, warm water salt water  gargles, stay well-hydrated, and if absolutely necessary may use plain Robitussin (guaifenesin only) that has nothing else in it and then saline nose spray if needed and then possibly if the throat hurt terribly something like a plain Chloraseptic Spray minimally    And actually advised the patient that while she is on the Tamiflu treatment with her being 19 weeks pregnant she needs to stay at home and rest so I did have them give her a work excuse Monday through Wednesday that way she will have today and tomorrow to start the Tamiflu and then the other 3 days to complete it and by then she has already been able to reach out to the OB/GYN    And then I did go ahead and proceed as a courtesy and do the cardiology referral with regards to the heart murmur        Follow Up     Return if symptoms worsen or fail to improve.

## 2022-04-28 ENCOUNTER — TELEPHONE (OUTPATIENT)
Dept: FAMILY MEDICINE CLINIC | Facility: CLINIC | Age: 27
End: 2022-04-28

## 2022-04-28 RX ORDER — NITROFURANTOIN 25; 75 MG/1; MG/1
100 CAPSULE ORAL 2 TIMES DAILY
COMMUNITY
Start: 2022-04-17 | End: 2022-04-28 | Stop reason: SDUPTHER

## 2022-04-28 RX ORDER — NITROFURANTOIN 25; 75 MG/1; MG/1
100 CAPSULE ORAL 2 TIMES DAILY
Qty: 10 CAPSULE | Refills: 0 | Status: SHIPPED | OUTPATIENT
Start: 2022-04-28 | End: 2022-09-07 | Stop reason: SDUPTHER

## 2022-04-28 NOTE — TELEPHONE ENCOUNTER
Patient called and is in [place].  Having symptoms of UTI which she had another previously on [facility].  At that time they gave her nitrofurantoin she is wondering if you would refill to keep her from going to the ER or urgent care.  Ede [address] [facility]

## 2022-09-07 ENCOUNTER — OFFICE VISIT (OUTPATIENT)
Dept: FAMILY MEDICINE CLINIC | Facility: CLINIC | Age: 27
End: 2022-09-07

## 2022-09-07 VITALS
BODY MASS INDEX: 23.63 KG/M2 | HEART RATE: 103 BPM | TEMPERATURE: 97.5 F | OXYGEN SATURATION: 99 % | HEIGHT: 66 IN | WEIGHT: 147 LBS | SYSTOLIC BLOOD PRESSURE: 124 MMHG | DIASTOLIC BLOOD PRESSURE: 80 MMHG

## 2022-09-07 DIAGNOSIS — N39.0 URINARY TRACT INFECTION WITH HEMATURIA, SITE UNSPECIFIED: Primary | ICD-10-CM

## 2022-09-07 DIAGNOSIS — R82.998 LEUKOCYTES IN URINE: ICD-10-CM

## 2022-09-07 DIAGNOSIS — R31.9 URINARY TRACT INFECTION WITH HEMATURIA, SITE UNSPECIFIED: Primary | ICD-10-CM

## 2022-09-07 DIAGNOSIS — R82.90 ABNORMAL URINE ODOR: ICD-10-CM

## 2022-09-07 PROBLEM — Z98.891 S/P CESAREAN SECTION: Status: ACTIVE | Noted: 2022-08-15

## 2022-09-07 PROBLEM — O98.511 COVID-19 AFFECTING PREGNANCY IN FIRST TRIMESTER: Status: ACTIVE | Noted: 2022-01-24

## 2022-09-07 PROBLEM — U07.1 COVID-19 AFFECTING PREGNANCY IN FIRST TRIMESTER: Status: ACTIVE | Noted: 2022-01-24

## 2022-09-07 PROBLEM — O98.511 COVID-19 AFFECTING PREGNANCY IN FIRST TRIMESTER: Status: RESOLVED | Noted: 2022-01-24 | Resolved: 2022-09-07

## 2022-09-07 PROBLEM — R01.1 HEART MURMUR PREVIOUSLY UNDIAGNOSED: Status: ACTIVE | Noted: 2022-03-17

## 2022-09-07 PROBLEM — U07.1 COVID-19 AFFECTING PREGNANCY IN FIRST TRIMESTER: Status: RESOLVED | Noted: 2022-01-24 | Resolved: 2022-09-07

## 2022-09-07 LAB
BILIRUB BLD-MCNC: NEGATIVE MG/DL
CLARITY, POC: ABNORMAL
COLOR UR: YELLOW
GLUCOSE UR STRIP-MCNC: NEGATIVE MG/DL
KETONES UR QL: NEGATIVE
LEUKOCYTE EST, POC: ABNORMAL
NITRITE UR-MCNC: POSITIVE MG/ML
PH UR: 5 [PH] (ref 5–8)
PROT UR STRIP-MCNC: NEGATIVE MG/DL
RBC # UR STRIP: ABNORMAL /UL
SP GR UR: 1.01 (ref 1–1.03)
UROBILINOGEN UR QL: ABNORMAL

## 2022-09-07 PROCEDURE — 81002 URINALYSIS NONAUTO W/O SCOPE: CPT | Performed by: NURSE PRACTITIONER

## 2022-09-07 PROCEDURE — 87186 SC STD MICRODIL/AGAR DIL: CPT | Performed by: NURSE PRACTITIONER

## 2022-09-07 PROCEDURE — 87077 CULTURE AEROBIC IDENTIFY: CPT | Performed by: NURSE PRACTITIONER

## 2022-09-07 PROCEDURE — 99213 OFFICE O/P EST LOW 20 MIN: CPT | Performed by: NURSE PRACTITIONER

## 2022-09-07 PROCEDURE — 87086 URINE CULTURE/COLONY COUNT: CPT | Performed by: NURSE PRACTITIONER

## 2022-09-07 RX ORDER — NITROFURANTOIN 25; 75 MG/1; MG/1
100 CAPSULE ORAL 2 TIMES DAILY
Qty: 10 CAPSULE | Refills: 0 | Status: SHIPPED | OUTPATIENT
Start: 2022-09-07

## 2022-09-07 NOTE — PROGRESS NOTES
"Chief Complaint  Urinary Tract Infection (Cloudy urine, and odor)    Subjective        Past Medical History:   Diagnosis Date   • COVID-19 affecting pregnancy in first trimester 1/24/2022     Social History     Tobacco Use   • Smoking status: Never Smoker   • Smokeless tobacco: Never Used   Vaping Use   • Vaping Use: Never used   Substance Use Topics   • Alcohol use: Never   • Drug use: Never      Current Outpatient Medications on File Prior to Visit   Medication Sig   • [DISCONTINUED] acetaminophen (TYLENOL) 500 MG tablet Take  by mouth.   • [DISCONTINUED] aspirin 81 MG EC tablet Take 81 mg by mouth Daily.   • [DISCONTINUED] nitrofurantoin, macrocrystal-monohydrate, (MACROBID) 100 MG capsule Take 1 capsule by mouth 2 (Two) Times a Day.   • [DISCONTINUED] oseltamivir (Tamiflu) 75 MG capsule Take 1 capsule by mouth 2 (Two) Times a Day.   • [DISCONTINUED] Prenatal MV-Min-Fe Fum-FA-DHA (PRENATAL 1 PO) Take  by mouth.     No current facility-administered medications on file prior to visit.      Allergies   Allergen Reactions   • Bee Pollen Hives   • Penicillins Hives   • Vancomycin Hives   • Clindamycin Rash      Health Maintenance Due   Topic Date Due   • ANNUAL PHYSICAL  Never done   • HPV VACCINES (1 - 2-dose series) Never done   • COVID-19 Vaccine (3 - Booster for Pfizer series) 06/20/2021   • PAP SMEAR  Never done      Nikole Kearney presents to [facility]  Here with cloudy urine and malodorous urine. Pt is about 3-4 weeks post-partum. Pt is not breast feeding. Denies pain or burning with urination. Denies fever, chills, or body aches.       Objective   Vital Signs:   /80 (BP Location: Left arm)   Pulse 103   Temp 97.5 °F (36.4 °C)   Ht 167.6 cm (66\")   Wt 66.7 kg (147 lb)   SpO2 99%   BMI 23.73 kg/m²     Review of Systems   Physical Exam  Vitals reviewed.   Constitutional:       General: She is not in acute distress.     Appearance: Normal appearance. She is " well-developed.   Eyes:      Conjunctiva/sclera: Conjunctivae normal.      Pupils: Pupils are equal, round, and reactive to light.   Skin:     General: Skin is warm and dry.   Neurological:      Mental Status: She is alert and oriented to person, place, and time.   Psychiatric:         Mood and Affect: Mood and affect normal.         Behavior: Behavior normal.         Thought Content: Thought content normal.         Judgment: Judgment normal.        Result Review :   The following data was reviewed by: PARISH Song on 09/07/2022:  UA    Urinalysis 10/13/21 9/7/22   Ketones, UA Negative Negative   Leukocytes, UA Trace (A) Small (1+) (A)   (A) Abnormal value                      Assessment and Plan    Diagnoses and all orders for this visit:    1. Urinary tract infection with hematuria, site unspecified (Primary)  -     nitrofurantoin, macrocrystal-monohydrate, (MACROBID) 100 MG capsule; Take 1 capsule by mouth 2 (Two) Times a Day.  Dispense: 10 capsule; Refill: 0    2. Abnormal urine odor  -     POCT urinalysis dipstick, manual  -     nitrofurantoin, macrocrystal-monohydrate, (MACROBID) 100 MG capsule; Take 1 capsule by mouth 2 (Two) Times a Day.  Dispense: 10 capsule; Refill: 0    3. Leukocytes in urine  -     Urine Culture - Urine, Urine, Random Void        Follow Up   Return if symptoms worsen or fail to improve.  Patient was given instructions and counseling regarding her condition or for health maintenance advice. Please see specific information pulled into the AVS if appropriate.

## 2022-09-09 LAB — BACTERIA SPEC AEROBE CULT: ABNORMAL

## 2023-10-31 ENCOUNTER — OFFICE VISIT (OUTPATIENT)
Dept: FAMILY MEDICINE CLINIC | Facility: CLINIC | Age: 28
End: 2023-10-31
Payer: COMMERCIAL

## 2023-10-31 VITALS
HEART RATE: 103 BPM | WEIGHT: 137 LBS | BODY MASS INDEX: 22.02 KG/M2 | TEMPERATURE: 97.5 F | OXYGEN SATURATION: 100 % | HEIGHT: 66 IN

## 2023-10-31 DIAGNOSIS — G89.29 CHRONIC BILATERAL LOW BACK PAIN WITHOUT SCIATICA: Primary | ICD-10-CM

## 2023-10-31 DIAGNOSIS — M54.50 CHRONIC BILATERAL LOW BACK PAIN WITHOUT SCIATICA: Primary | ICD-10-CM

## 2023-10-31 PROCEDURE — 99214 OFFICE O/P EST MOD 30 MIN: CPT | Performed by: NURSE PRACTITIONER

## 2023-10-31 RX ORDER — TIZANIDINE 4 MG/1
4 TABLET ORAL NIGHTLY PRN
Qty: 20 TABLET | Refills: 0 | Status: SHIPPED | OUTPATIENT
Start: 2023-10-31

## 2023-10-31 NOTE — PROGRESS NOTES
"Chief Complaint  Back Pain (Tailbone pain x giving birth in )    PHQ-2 Total Score: 0    Subjective        Past Medical History:   Diagnosis Date    COVID-19 affecting pregnancy in first trimester 2022     Social History     Tobacco Use    Smoking status: Never     Passive exposure: Never    Smokeless tobacco: Never   Vaping Use    Vaping Use: Never used   Substance Use Topics    Alcohol use: Never    Drug use: Never      Current Outpatient Medications on File Prior to Visit   Medication Sig    [DISCONTINUED] nitrofurantoin, macrocrystal-monohydrate, (MACROBID) 100 MG capsule Take 1 capsule by mouth 2 (Two) Times a Day.     No current facility-administered medications on file prior to visit.      Allergies   Allergen Reactions    Bee Pollen Hives    Penicillins Hives    Vancomycin Hives    Clindamycin Rash      Health Maintenance Due   Topic Date Due    ANNUAL PHYSICAL  Never done    PAP SMEAR  Never done    INFLUENZA VACCINE  2023    COVID-19 Vaccine (3 - 2023-24 season) 2023      Nikole Kearney presents to [facility]  History of Present Illness  Here with tailbone pain x about 18 months. Pain with sitting and pressure on the upper buttocks and cannot lie on her back. Painful to stand up straight or sit up straight. No cyst in the area. Possible low back swelling. No injury. Emergency c/s with daughter and then scheduled  with 2nd child. Pushed for 5 1/2 hours with oldest daughter. Believes she had a spinal block after receiving the epidural for the . Worse after 2nd . No headaches. She has taken Ibuprofen for symptoms.     Objective   Vital Signs:   Pulse 103   Temp 97.5 °F (36.4 °C)   Ht 167.6 cm (66\")   Wt 62.1 kg (137 lb)   SpO2 100%   BMI 22.11 kg/m²     Review of Systems   Physical Exam  Vitals reviewed.   Constitutional:       General: She is not in acute distress.     Appearance: Normal appearance. She is well-developed. "   Eyes:      Conjunctiva/sclera: Conjunctivae normal.      Pupils: Pupils are equal, round, and reactive to light.   Cardiovascular:      Rate and Rhythm: Normal rate and regular rhythm.      Heart sounds: Normal heart sounds.   Pulmonary:      Effort: Pulmonary effort is normal.      Breath sounds: Normal breath sounds.   Musculoskeletal:      Lumbar back: Tenderness and bony tenderness present.   Skin:     General: Skin is warm and dry.   Neurological:      Mental Status: She is alert and oriented to person, place, and time.   Psychiatric:         Mood and Affect: Mood and affect normal.         Behavior: Behavior normal.         Thought Content: Thought content normal.         Judgment: Judgment normal.        Result Review :   The following data was reviewed by: PARISH Song on 10/31/2023:    Data reviewed : Radiologic studies lumbar spine x-ray and sacral x-ray           Assessment and Plan    Diagnoses and all orders for this visit:    1. Chronic bilateral low back pain without sciatica (Primary)  -     XR Sacrum & Coccyx (In Office)  -     XR Spine Lumbar 2 or 3 View (In Office)  -     tiZANidine (ZANAFLEX) 4 MG tablet; Take 1 tablet by mouth At Night As Needed for Muscle Spasms.  Dispense: 20 tablet; Refill: 0      BMI is within normal parameters. No other follow-up for BMI required.     Take muscle relaxer at night before bed. Take Tylenol or ibuprofen or Advil Dual action.     Follow Up   Return if symptoms worsen or fail to improve.  Patient was given instructions and counseling regarding her condition or for health maintenance advice. Please see specific information pulled into the AVS if appropriate.

## 2024-04-23 ENCOUNTER — OFFICE VISIT (OUTPATIENT)
Dept: FAMILY MEDICINE CLINIC | Facility: CLINIC | Age: 29
End: 2024-04-23
Payer: COMMERCIAL

## 2024-04-23 VITALS
DIASTOLIC BLOOD PRESSURE: 62 MMHG | SYSTOLIC BLOOD PRESSURE: 102 MMHG | OXYGEN SATURATION: 98 % | BODY MASS INDEX: 22.18 KG/M2 | HEIGHT: 66 IN | HEART RATE: 87 BPM | WEIGHT: 138 LBS | TEMPERATURE: 97.8 F

## 2024-04-23 DIAGNOSIS — N30.00 ACUTE CYSTITIS WITHOUT HEMATURIA: ICD-10-CM

## 2024-04-23 DIAGNOSIS — R30.0 BURNING WITH URINATION: Primary | ICD-10-CM

## 2024-04-23 LAB
BILIRUB BLD-MCNC: NEGATIVE MG/DL
CLARITY, POC: CLEAR
COLOR UR: YELLOW
EXPIRATION DATE: ABNORMAL
GLUCOSE UR STRIP-MCNC: NEGATIVE MG/DL
KETONES UR QL: NEGATIVE
LEUKOCYTE EST, POC: ABNORMAL
Lab: ABNORMAL
NITRITE UR-MCNC: NEGATIVE MG/ML
PH UR: 6 [PH] (ref 5–8)
PROT UR STRIP-MCNC: NEGATIVE MG/DL
RBC # UR STRIP: ABNORMAL /UL
SP GR UR: 1.01 (ref 1–1.03)
UROBILINOGEN UR QL: ABNORMAL

## 2024-04-23 PROCEDURE — 81003 URINALYSIS AUTO W/O SCOPE: CPT | Performed by: FAMILY MEDICINE

## 2024-04-23 PROCEDURE — 99213 OFFICE O/P EST LOW 20 MIN: CPT | Performed by: FAMILY MEDICINE

## 2024-04-23 PROCEDURE — 87086 URINE CULTURE/COLONY COUNT: CPT | Performed by: FAMILY MEDICINE

## 2024-04-23 RX ORDER — NITROFURANTOIN 25; 75 MG/1; MG/1
100 CAPSULE ORAL 2 TIMES DAILY
Qty: 14 CAPSULE | Refills: 0 | Status: SHIPPED | OUTPATIENT
Start: 2024-04-23 | End: 2024-04-30

## 2024-04-23 NOTE — PROGRESS NOTES
Chief Complaint  Urinary Tract Infection (Painful urination, burning with urination)    Subjective          Nikole Kearney presents to [facility]  Urinary Tract Infection   This is a new problem. Episode onset: 3 DAYS. The problem occurs constantly. The problem has been unchanged. The quality of the pain is described as burning. The pain is mild. There has been no fever. Associated symptoms include frequency and urgency. Pertinent negatives include no chills, discharge, flank pain, hematuria, hesitancy, nausea, possible pregnancy, sweats or vomiting. She has tried nothing for the symptoms.       BMI is within normal parameters. No other follow-up for BMI required.       Objective   Allergies   Allergen Reactions    Bee Pollen Hives    Penicillins Hives    Vancomycin Hives    Clindamycin Rash     Immunization History   Administered Date(s) Administered    COVID-19 (PFIZER) Purple Cap Monovalent 2020, 2021    DTP / HiB 1997    DTaP, Unspecified 1996, 1996, 1996, 1999    Flu Vaccine Split Quad 2020    Fluzone (or Fluarix & Flulaval for VFC) >6mos 10/04/2018, 2019, 2020, 10/20/2021    Hep B, Adolescent or Pediatric 1995, 1996, 1997    Hep B, Unspecified 1995, 1996, 1997    HiB 1996, 1996, 1996    IPV 1996, 1996, 1996, 1999    Influenza, Unspecified 10/04/2018, 2019, 2020, 10/20/2021    MMR 1997, 1999    Meningococcal Polysaccharide 2009    Tdap 2007, 2022, 2023     Past Medical History:   Diagnosis Date    COVID-19 affecting pregnancy in first trimester 2022      Past Surgical History:   Procedure Laterality Date     SECTION        Social History     Socioeconomic History    Marital status:    Tobacco Use    Smoking status: Never     Passive exposure: Never    Smokeless tobacco: Never  "  Vaping Use    Vaping status: Never Used   Substance and Sexual Activity    Alcohol use: Never    Drug use: Never        Current Outpatient Medications:     nitrofurantoin, macrocrystal-monohydrate, (Macrobid) 100 MG capsule, Take 1 capsule by mouth 2 (Two) Times a Day for 7 days., Disp: 14 capsule, Rfl: 0   Family History   Problem Relation Age of Onset    Asthma Mother     No Known Problems Father           Vital Signs:   Vitals:    04/23/24 1437   BP: 102/62   BP Location: Left arm   Pulse: 87   Temp: 97.8 °F (36.6 °C)   SpO2: 98%   Weight: 62.6 kg (138 lb)   Height: 167.6 cm (66\")       Review of Systems   Constitutional:  Negative for chills, fatigue and fever.   HENT:  Negative for sore throat.    Eyes:  Negative for visual disturbance.   Respiratory:  Negative for cough, chest tightness, shortness of breath and wheezing.    Cardiovascular:  Negative for chest pain, palpitations and leg swelling.   Gastrointestinal:  Negative for abdominal pain, diarrhea, nausea and vomiting.   Genitourinary:  Positive for frequency and urgency. Negative for flank pain, hematuria and hesitancy.   Neurological:  Negative for light-headedness and headaches.      Physical Exam  Vitals reviewed.   Constitutional:       Appearance: Normal appearance. She is well-developed.   HENT:      Head: Normocephalic and atraumatic.      Right Ear: External ear normal.      Left Ear: External ear normal.      Mouth/Throat:      Pharynx: No oropharyngeal exudate.   Eyes:      Conjunctiva/sclera: Conjunctivae normal.      Pupils: Pupils are equal, round, and reactive to light.   Cardiovascular:      Rate and Rhythm: Normal rate and regular rhythm.      Pulses: Normal pulses.      Heart sounds: Normal heart sounds. No murmur heard.     No friction rub. No gallop.   Pulmonary:      Effort: Pulmonary effort is normal.      Breath sounds: Normal breath sounds. No wheezing or rhonchi.   Abdominal:      General: Abdomen is flat. Bowel sounds are " normal. There is no distension.      Palpations: Abdomen is soft. There is no mass.      Tenderness: There is no abdominal tenderness. There is no right CVA tenderness, left CVA tenderness, guarding or rebound.      Hernia: No hernia is present.   Musculoskeletal:         General: Normal range of motion.   Skin:     General: Skin is warm and dry.      Capillary Refill: Capillary refill takes less than 2 seconds.   Neurological:      General: No focal deficit present.      Mental Status: She is alert and oriented to person, place, and time.      Cranial Nerves: No cranial nerve deficit.   Psychiatric:         Mood and Affect: Mood and affect normal.         Behavior: Behavior normal.         Thought Content: Thought content normal.         Judgment: Judgment normal.        Result Review :                 Assessment and Plan    Diagnoses and all orders for this visit:    1. Burning with urination (Primary)  -     POC Urinalysis Dipstick, Automated  -     Urine Culture - Urine, Urine, Clean Catch; Future    2. Acute cystitis without hematuria  -     nitrofurantoin, macrocrystal-monohydrate, (Macrobid) 100 MG capsule; Take 1 capsule by mouth 2 (Two) Times a Day for 7 days.  Dispense: 14 capsule; Refill: 0            Follow Up   Return if symptoms worsen or fail to improve.  Patient was given instructions and counseling regarding her condition or for health maintenance advice. Please see specific information pulled into the AVS if appropriate.

## 2024-04-24 LAB — BACTERIA SPEC AEROBE CULT: NORMAL

## 2024-04-26 ENCOUNTER — OFFICE VISIT (OUTPATIENT)
Dept: FAMILY MEDICINE CLINIC | Facility: CLINIC | Age: 29
End: 2024-04-26
Payer: COMMERCIAL

## 2024-04-26 VITALS
WEIGHT: 135 LBS | HEIGHT: 66 IN | DIASTOLIC BLOOD PRESSURE: 70 MMHG | BODY MASS INDEX: 21.69 KG/M2 | TEMPERATURE: 98.4 F | OXYGEN SATURATION: 100 % | HEART RATE: 91 BPM | SYSTOLIC BLOOD PRESSURE: 110 MMHG

## 2024-04-26 DIAGNOSIS — J06.9 ACUTE URI: ICD-10-CM

## 2024-04-26 DIAGNOSIS — J02.9 SORE THROAT: Primary | ICD-10-CM

## 2024-04-26 LAB
EXPIRATION DATE: NORMAL
INTERNAL CONTROL: NORMAL
Lab: NORMAL
S PYO AG THROAT QL: NEGATIVE

## 2024-04-26 PROCEDURE — 87880 STREP A ASSAY W/OPTIC: CPT | Performed by: FAMILY MEDICINE

## 2024-04-26 PROCEDURE — 99213 OFFICE O/P EST LOW 20 MIN: CPT | Performed by: FAMILY MEDICINE

## 2024-04-26 NOTE — PROGRESS NOTES
"Chief Complaint  Sore Throat    Subjective      Nikole Kearney is a 28 y.o. female who presents to [facility]     Sore throat. Sinus symptoms, headache, cough, headache, drainage. Symptoms for the pas 3 days. No fever.    Objective   Vital Signs:   Vitals:    04/26/24 1326   BP: 110/70   BP Location: Left arm   Pulse: 91   Temp: 98.4 °F (36.9 °C)   SpO2: 100%   Weight: 61.2 kg (135 lb)   Height: 167.6 cm (66\")     Body mass index is 21.79 kg/m².    Wt Readings from Last 3 Encounters:   04/26/24 61.2 kg (135 lb)   04/23/24 62.6 kg (138 lb)   10/31/23 62.1 kg (137 lb)     BP Readings from Last 3 Encounters:   04/26/24 110/70   04/23/24 102/62   09/07/22 124/80       Health Maintenance   Topic Date Due    ANNUAL PHYSICAL  Never done    PAP SMEAR  Never done    COVID-19 Vaccine (3 - 2023-24 season) 09/01/2023    INFLUENZA VACCINE  08/01/2024    TDAP/TD VACCINES (4 - Td or Tdap) 04/19/2033    HEPATITIS C SCREENING  Completed    Pneumococcal Vaccine 0-64  Aged Out       Physical Exam  Vitals and nursing note reviewed.   Constitutional:       General: She is not in acute distress.     Appearance: Normal appearance.   HENT:      Head: Normocephalic and atraumatic.      Nose: Rhinorrhea present.      Mouth/Throat:      Pharynx: Posterior oropharyngeal erythema present. No oropharyngeal exudate.   Cardiovascular:      Rate and Rhythm: Normal rate and regular rhythm.      Heart sounds: No murmur heard.  Pulmonary:      Effort: Pulmonary effort is normal. No respiratory distress.      Breath sounds: Normal breath sounds. No wheezing.   Skin:     General: Skin is warm and dry.   Neurological:      Mental Status: She is alert.   Psychiatric:         Mood and Affect: Mood normal.         Behavior: Behavior normal.          Result Review :  The following data was reviewed by: Rogers Coffman MD on 04/26/2024:                                Lab Results   Lab 04/26/24  1350   RAPID STREP A SCREEN " Negative                                    Procedures          Assessment & Plan  Sore throat    Acute URI  Swabs negative in office. Advised supportive measures such as hydration and rest. Can do OTC supportive medications such as an antihistamine or decongestant if needed. Honey can be soothing to a sore throat. Return if new or worsening symptoms.    Orders Placed This Encounter   Procedures    POCT rapid strep A             BMI is within normal parameters. No other follow-up for BMI required.         FOLLOW UP  Return if symptoms worsen or fail to improve.  Patient was given instructions and counseling regarding her condition or for health maintenance advice. Please see specific information pulled into the AVS if appropriate.       Rogers Coffman MD  04/26/24  14:21 EDT    CURRENT & DISCONTINUED MEDICATIONS  Current Outpatient Medications   Medication Instructions    nitrofurantoin (macrocrystal-monohydrate) (MACROBID) 100 mg, Oral, 2 Times Daily       There are no discontinued medications.

## 2024-08-02 ENCOUNTER — CLINICAL SUPPORT (OUTPATIENT)
Dept: FAMILY MEDICINE CLINIC | Facility: CLINIC | Age: 29
End: 2024-08-02
Payer: COMMERCIAL

## 2024-08-02 DIAGNOSIS — N39.0 URINARY TRACT INFECTION WITHOUT HEMATURIA, SITE UNSPECIFIED: Primary | ICD-10-CM

## 2024-08-02 LAB
BACTERIA UR QL AUTO: ABNORMAL /HPF
BILIRUB UR QL STRIP: NEGATIVE
CLARITY UR: ABNORMAL
COLOR UR: YELLOW
GLUCOSE UR STRIP-MCNC: NEGATIVE MG/DL
HGB UR QL STRIP.AUTO: ABNORMAL
HYALINE CASTS UR QL AUTO: ABNORMAL /LPF
KETONES UR QL STRIP: ABNORMAL
LEUKOCYTE ESTERASE UR QL STRIP.AUTO: ABNORMAL
NITRITE UR QL STRIP: POSITIVE
PH UR STRIP.AUTO: 6 [PH] (ref 5–8)
PROT UR QL STRIP: ABNORMAL
RBC # UR STRIP: ABNORMAL /HPF
REF LAB TEST METHOD: ABNORMAL
SP GR UR STRIP: 1.03 (ref 1–1.03)
SQUAMOUS #/AREA URNS HPF: ABNORMAL /HPF
UROBILINOGEN UR QL STRIP: ABNORMAL
WBC # UR STRIP: ABNORMAL /HPF

## 2024-08-02 PROCEDURE — 81001 URINALYSIS AUTO W/SCOPE: CPT | Performed by: FAMILY MEDICINE

## 2024-08-02 PROCEDURE — 87077 CULTURE AEROBIC IDENTIFY: CPT | Performed by: FAMILY MEDICINE

## 2024-08-02 PROCEDURE — 87186 SC STD MICRODIL/AGAR DIL: CPT | Performed by: FAMILY MEDICINE

## 2024-08-02 PROCEDURE — 87086 URINE CULTURE/COLONY COUNT: CPT | Performed by: FAMILY MEDICINE

## 2024-08-03 ENCOUNTER — TELEPHONE (OUTPATIENT)
Dept: FAMILY MEDICINE CLINIC | Facility: CLINIC | Age: 29
End: 2024-08-03
Payer: COMMERCIAL

## 2024-08-03 DIAGNOSIS — R30.0 DYSURIA: Primary | ICD-10-CM

## 2024-08-03 DIAGNOSIS — R82.71 BACTERIA IN URINE: ICD-10-CM

## 2024-08-03 RX ORDER — NITROFURANTOIN 25; 75 MG/1; MG/1
100 CAPSULE ORAL 2 TIMES DAILY
Qty: 10 CAPSULE | Refills: 0 | Status: SHIPPED | OUTPATIENT
Start: 2024-08-03

## 2024-08-03 NOTE — TELEPHONE ENCOUNTER
Nikole is Leann's daughter in law.  She had her come in yesterday and leave a urine sample.  Urinalysis results are back and in chart (culture is still pending).  She is wanting to see if an antibiotic can be called in today to [facility]?

## 2024-08-05 LAB — BACTERIA SPEC AEROBE CULT: ABNORMAL

## 2025-02-17 RX ORDER — AZITHROMYCIN 250 MG/1
TABLET, FILM COATED ORAL
Qty: 6 TABLET | Refills: 0 | Status: SHIPPED | OUTPATIENT
Start: 2025-02-17

## 2025-02-17 RX ORDER — OSELTAMIVIR PHOSPHATE 75 MG/1
75 CAPSULE ORAL DAILY
Qty: 7 CAPSULE | Refills: 0 | Status: SHIPPED | OUTPATIENT
Start: 2025-02-17 | End: 2025-02-24

## 2025-05-30 ENCOUNTER — OFFICE VISIT (OUTPATIENT)
Dept: FAMILY MEDICINE CLINIC | Facility: CLINIC | Age: 30
End: 2025-05-30
Payer: COMMERCIAL

## 2025-05-30 VITALS
WEIGHT: 144 LBS | SYSTOLIC BLOOD PRESSURE: 108 MMHG | BODY MASS INDEX: 23.14 KG/M2 | HEART RATE: 91 BPM | OXYGEN SATURATION: 99 % | TEMPERATURE: 97.3 F | DIASTOLIC BLOOD PRESSURE: 68 MMHG | HEIGHT: 66 IN

## 2025-05-30 DIAGNOSIS — M25.531 RIGHT WRIST PAIN: Primary | ICD-10-CM

## 2025-05-30 PROCEDURE — 99213 OFFICE O/P EST LOW 20 MIN: CPT

## 2025-05-30 RX ORDER — MELOXICAM 7.5 MG/1
7.5 TABLET ORAL DAILY
Qty: 30 TABLET | Refills: 0 | Status: SHIPPED | OUTPATIENT
Start: 2025-05-30

## 2025-05-30 RX ORDER — METHYLPREDNISOLONE 4 MG/1
TABLET ORAL
Qty: 1 EACH | Refills: 0 | Status: SHIPPED | OUTPATIENT
Start: 2025-05-30

## 2025-05-30 NOTE — PROGRESS NOTES
"Chief Complaint  Hand Pain (Right hand pain)    Little interest or pleasure in doing things? Not at all   Feeling down, depressed, or hopeless? Not at all   PHQ-2 Total Score 0          History of Present Illness:  Nikole Kearney is a 29 y.o. female who presents to [facility] with a past medical history of  Past Medical History:   Diagnosis Date    COVID-19 affecting pregnancy in first trimester 1/24/2022        History of Present Illness  The patient is a 29-year-old female who presents today with hand pain.    She reports an incident in early April 2025, during which she sustained an injury to her wrist, the cause of which remains unknown to her. The condition has progressively deteriorated, to the extent that she experiences radiating pain up her arm when attempting to lift a gallon of milk. The primary location of the pain is the top part of her wrist, and it is particularly pronounced when she jars or bends it. She describes the pain as severe, akin to carpal tunnel syndrome, and notes that it is exacerbated by certain movements, such as turning her thumb upwards. Despite the absence of swelling or bruising, she reports significant discomfort. She does not recall any specific incident of trauma to her wrist. She has been utilizing a brace at night, but this appears to worsen the pain, often necessitating its removal MCC through the night due to throbbing discomfort. She also mentions the use of an immobilizer at home.      Objective   Vital Signs:   Vitals:    05/30/25 1105   BP: 108/68   Pulse: 91   Temp: 97.3 °F (36.3 °C)   SpO2: 99%   Weight: 65.3 kg (144 lb)   Height: 167.6 cm (66\")     Body mass index is 23.24 kg/m².    Wt Readings from Last 3 Encounters:   05/30/25 65.3 kg (144 lb)   04/26/24 61.2 kg (135 lb)   04/23/24 62.6 kg (138 lb)     BP Readings from Last 3 Encounters:   05/30/25 108/68   04/26/24 110/70   04/23/24 102/62       Health Maintenance   Topic Date Due "    PAP SMEAR  Never done    ANNUAL PHYSICAL  Never done    COVID-19 Vaccine (3 - 2024-25 season) 05/30/2026 (Originally 9/1/2024)    INFLUENZA VACCINE  07/01/2025    TDAP/TD VACCINES (4 - Td or Tdap) 04/19/2033    HEPATITIS C SCREENING  Completed    Pneumococcal Vaccine 0-49  Aged Out       Review of Systems   Physical Exam  Vitals reviewed.   Constitutional:       Appearance: Normal appearance.   Eyes:      Pupils: Pupils are equal, round, and reactive to light.   Cardiovascular:      Rate and Rhythm: Normal rate and regular rhythm.   Pulmonary:      Effort: Pulmonary effort is normal.      Breath sounds: Normal breath sounds.   Musculoskeletal:      Right wrist: Tenderness present. Decreased range of motion.   Skin:     General: Skin is warm and dry.   Neurological:      General: No focal deficit present.      Mental Status: She is alert and oriented to person, place, and time.   Psychiatric:         Mood and Affect: Mood normal.            Result Review :  The following data was reviewed by: PARISH New on 05/30/2025:  No visits with results within 1 Month(s) from this visit.   Latest known visit with results is:   Clinical Support on 08/02/2024   Component Date Value    Urine Culture 08/02/2024 >100,000 CFU/mL Escherichia coli (A)     Color, UA 08/02/2024 Yellow     Appearance, UA 08/02/2024 Cloudy (A)     pH, UA 08/02/2024 6.0     Specific Gravity, UA 08/02/2024 1.026     Glucose, UA 08/02/2024 Negative     Ketones, UA 08/02/2024 Trace (A)     Bilirubin, UA 08/02/2024 Negative     Blood, UA 08/02/2024 Trace (A)     Protein, UA 08/02/2024 30 mg/dL (1+) (A)     Leuk Esterase, UA 08/02/2024 Large (3+) (A)     Nitrite, UA 08/02/2024 Positive (A)     Urobilinogen, UA 08/02/2024 1.0 E.U./dL     RBC, UA 08/02/2024 0-2     WBC, UA 08/02/2024 Too Numerous to Count (A)     Bacteria, UA 08/02/2024 4+ (A)     Squamous Epithelial Cell* 08/02/2024 7-12 (A)     Hyaline Casts, UA 08/02/2024 7-12     Methodology  08/02/2024 Automated Microscopy      XR Wrist 2 View Right  Result Date: 5/30/2025  Impression: No acute finding Electronically Signed: Dallas Kincaid MD  5/30/2025 11:58 AM EDT  Workstation ID: [id number]    Results        Procedures        Assessment and Plan   Diagnoses and all orders for this visit:    1. Right wrist pain (Primary)  -     XR Wrist 2 View Right    Other orders  -     methylPREDNISolone (MEDROL) 4 MG dose pack; Take as directed on package instructions.  Dispense: 1 each; Refill: 0  -     meloxicam (Mobic) 7.5 MG tablet; Take 1 tablet by mouth Daily.  Dispense: 30 tablet; Refill: 0        Assessment & Plan  1. Hand pain.  - Reports worsening hand pain since early April, with difficulty performing daily tasks such as picking up a gallon of milk or putting children in car seats.  - Pain extends up the arm and is exacerbated by certain movements; no swelling or bruising observed.  - An x-ray will be conducted to rule out a snuffbox fracture.  - If the x-ray is negative, a brace and a short course of steroids will be considered.      BMI is within normal parameters. No other follow-up for BMI required.         FOLLOW UP  Return if symptoms worsen or fail to improve.    Patient was given instructions and counseling regarding her condition or for health maintenance advice. Please see specific information pulled into the AVS if appropriate.       PARISH New  05/30/25  16:27 EDT    CURRENT & DISCONTINUED MEDICATIONS  Current Outpatient Medications   Medication Instructions    meloxicam (MOBIC) 7.5 mg, Oral, Daily    methylPREDNISolone (MEDROL) 4 MG dose pack Take as directed on package instructions.       Medications Discontinued During This Encounter   Medication Reason    azithromycin (Zithromax) 250 MG tablet *Therapy completed    nitrofurantoin, macrocrystal-monohydrate, (Macrobid) 100 MG capsule *Therapy completed        EMR Dragon/Transcription disclaimer:  Parts of this encounter note are  electronic transcription/translation of spoken language to printed text.     Patient or patient representative verbalized consent for the use of Ambient Listening during the visit with  PARISH New for chart documentation. 5/30/2025  16:24 EDT

## 2025-05-30 NOTE — Clinical Note
Please call patient and let her know that her x-ray was negative for fractures.  Let her know she needs to wear her brace for at least 2 weeks.  Let her know I sent over a Medrol Dosepak and meloxicam.